# Patient Record
Sex: FEMALE | Race: WHITE | Employment: UNEMPLOYED | URBAN - METROPOLITAN AREA
[De-identification: names, ages, dates, MRNs, and addresses within clinical notes are randomized per-mention and may not be internally consistent; named-entity substitution may affect disease eponyms.]

---

## 2021-05-24 ENCOUNTER — HOSPITAL ENCOUNTER (OUTPATIENT)
Dept: NUTRITION | Age: 56
End: 2021-05-24

## 2021-09-10 RX ORDER — CHOLECALCIFEROL (VITAMIN D3) 50 MCG
CAPSULE ORAL DAILY
COMMUNITY

## 2021-09-10 RX ORDER — OMEPRAZOLE 40 MG/1
40 CAPSULE, DELAYED RELEASE ORAL DAILY
COMMUNITY

## 2021-09-10 RX ORDER — METOPROLOL SUCCINATE 25 MG/1
50 TABLET, EXTENDED RELEASE ORAL DAILY
COMMUNITY

## 2021-09-10 RX ORDER — RIZATRIPTAN BENZOATE 10 MG/1
10 TABLET ORAL
COMMUNITY

## 2021-09-10 RX ORDER — LOSARTAN POTASSIUM AND HYDROCHLOROTHIAZIDE 12.5; 5 MG/1; MG/1
1 TABLET ORAL DAILY
COMMUNITY

## 2021-09-10 RX ORDER — SIMVASTATIN 20 MG/1
20 TABLET, FILM COATED ORAL
COMMUNITY

## 2021-09-10 RX ORDER — BUPROPION HYDROCHLORIDE 150 MG/1
150 TABLET ORAL DAILY
COMMUNITY

## 2021-09-10 RX ORDER — ASPIRIN 81 MG/1
81 TABLET ORAL DAILY
COMMUNITY

## 2021-09-13 ENCOUNTER — HOSPITAL ENCOUNTER (OUTPATIENT)
Age: 56
Setting detail: OUTPATIENT SURGERY
Discharge: HOME | End: 2021-09-13
Attending: INTERNAL MEDICINE | Admitting: INTERNAL MEDICINE
Payer: OTHER GOVERNMENT

## 2021-09-13 VITALS
DIASTOLIC BLOOD PRESSURE: 75 MMHG | BODY MASS INDEX: 34.02 KG/M2 | WEIGHT: 204.2 LBS | OXYGEN SATURATION: 96 % | RESPIRATION RATE: 18 BRPM | HEIGHT: 65 IN | SYSTOLIC BLOOD PRESSURE: 121 MMHG | HEART RATE: 84 BPM | TEMPERATURE: 98.8 F

## 2021-09-13 DIAGNOSIS — R07.9 CHEST PAIN: ICD-10-CM

## 2021-09-13 LAB
ANION GAP SERPL CALC-SCNC: 11 MMOL/L (ref 3–18)
ATRIAL RATE: 69 BPM
BUN SERPL-MCNC: 14 MG/DL (ref 7–18)
BUN/CREAT SERPL: 18 (ref 12–20)
CALCIUM SERPL-MCNC: 9.6 MG/DL (ref 8.5–10.1)
CALCULATED P AXIS, ECG09: 66 DEGREES
CALCULATED R AXIS, ECG10: 60 DEGREES
CALCULATED T AXIS, ECG11: 58 DEGREES
CHLORIDE SERPL-SCNC: 105 MMOL/L (ref 100–111)
CHOLEST SERPL-MCNC: 232 MG/DL
CO2 SERPL-SCNC: 24 MMOL/L (ref 21–32)
CREAT SERPL-MCNC: 0.8 MG/DL (ref 0.6–1.3)
DIAGNOSIS, 93000: NORMAL
ERYTHROCYTE [DISTWIDTH] IN BLOOD BY AUTOMATED COUNT: 11.7 % (ref 11.6–14.5)
GLUCOSE SERPL-MCNC: 336 MG/DL (ref 74–99)
HCT VFR BLD AUTO: 46.1 % (ref 35–45)
HDLC SERPL-MCNC: 35 MG/DL (ref 40–60)
HDLC SERPL: 6.6 {RATIO} (ref 0–5)
HGB BLD-MCNC: 16 G/DL (ref 12–16)
INR PPP: 0.9 (ref 0.8–1.2)
LDLC SERPL CALC-MCNC: 149.4 MG/DL (ref 0–100)
LIPID PROFILE,FLP: ABNORMAL
MAGNESIUM SERPL-MCNC: 2 MG/DL (ref 1.6–2.6)
MCH RBC QN AUTO: 31.7 PG (ref 24–34)
MCHC RBC AUTO-ENTMCNC: 34.7 G/DL (ref 31–37)
MCV RBC AUTO: 91.5 FL (ref 78–100)
P-R INTERVAL, ECG05: 156 MS
PLATELET # BLD AUTO: 224 K/UL (ref 135–420)
PMV BLD AUTO: 10.2 FL (ref 9.2–11.8)
POTASSIUM SERPL-SCNC: 4 MMOL/L (ref 3.5–5.5)
PROTHROMBIN TIME: 11.7 SEC (ref 11.5–15.2)
Q-T INTERVAL, ECG07: 412 MS
QRS DURATION, ECG06: 76 MS
QTC CALCULATION (BEZET), ECG08: 441 MS
RBC # BLD AUTO: 5.04 M/UL (ref 4.2–5.3)
SODIUM SERPL-SCNC: 140 MMOL/L (ref 136–145)
TRIGL SERPL-MCNC: 238 MG/DL (ref ?–150)
VENTRICULAR RATE, ECG03: 69 BPM
VLDLC SERPL CALC-MCNC: 47.6 MG/DL
WBC # BLD AUTO: 8.6 K/UL (ref 4.6–13.2)

## 2021-09-13 PROCEDURE — C1769 GUIDE WIRE: HCPCS | Performed by: INTERNAL MEDICINE

## 2021-09-13 PROCEDURE — 77030008543 HC TBNG MON PRSS MRTM -A: Performed by: INTERNAL MEDICINE

## 2021-09-13 PROCEDURE — 93005 ELECTROCARDIOGRAM TRACING: CPT

## 2021-09-13 PROCEDURE — 77030004522 HC CATH ANGI DX EXPO BSC -A: Performed by: INTERNAL MEDICINE

## 2021-09-13 PROCEDURE — 74011250636 HC RX REV CODE- 250/636: Performed by: INTERNAL MEDICINE

## 2021-09-13 PROCEDURE — 99153 MOD SED SAME PHYS/QHP EA: CPT | Performed by: INTERNAL MEDICINE

## 2021-09-13 PROCEDURE — 77030004521 HC CATH ANGI DX COOK -B: Performed by: INTERNAL MEDICINE

## 2021-09-13 PROCEDURE — 80061 LIPID PANEL: CPT

## 2021-09-13 PROCEDURE — 93458 L HRT ARTERY/VENTRICLE ANGIO: CPT | Performed by: INTERNAL MEDICINE

## 2021-09-13 PROCEDURE — 85610 PROTHROMBIN TIME: CPT

## 2021-09-13 PROCEDURE — 77030016699 HC CATH ANGI DX INFN1 CARD -A: Performed by: INTERNAL MEDICINE

## 2021-09-13 PROCEDURE — 80048 BASIC METABOLIC PNL TOTAL CA: CPT

## 2021-09-13 PROCEDURE — 85027 COMPLETE CBC AUTOMATED: CPT

## 2021-09-13 PROCEDURE — 99152 MOD SED SAME PHYS/QHP 5/>YRS: CPT | Performed by: INTERNAL MEDICINE

## 2021-09-13 PROCEDURE — C1894 INTRO/SHEATH, NON-LASER: HCPCS | Performed by: INTERNAL MEDICINE

## 2021-09-13 PROCEDURE — 74011000636 HC RX REV CODE- 636: Performed by: INTERNAL MEDICINE

## 2021-09-13 PROCEDURE — 74011000250 HC RX REV CODE- 250: Performed by: INTERNAL MEDICINE

## 2021-09-13 PROCEDURE — 77030042317 HC BND COMPR HEMSTAT -B: Performed by: INTERNAL MEDICINE

## 2021-09-13 PROCEDURE — 83735 ASSAY OF MAGNESIUM: CPT

## 2021-09-13 PROCEDURE — 77030013797 HC KT TRNSDUC PRSSR EDWD -A: Performed by: INTERNAL MEDICINE

## 2021-09-13 RX ORDER — MIDAZOLAM HYDROCHLORIDE 1 MG/ML
INJECTION, SOLUTION INTRAMUSCULAR; INTRAVENOUS AS NEEDED
Status: DISCONTINUED | OUTPATIENT
Start: 2021-09-13 | End: 2021-09-13 | Stop reason: HOSPADM

## 2021-09-13 RX ORDER — GUAIFENESIN 100 MG/5ML
81 LIQUID (ML) ORAL ONCE
Status: DISCONTINUED | OUTPATIENT
Start: 2021-09-13 | End: 2021-09-13 | Stop reason: HOSPADM

## 2021-09-13 RX ORDER — SODIUM CHLORIDE 9 MG/ML
25 INJECTION, SOLUTION INTRAVENOUS CONTINUOUS
Status: DISCONTINUED | OUTPATIENT
Start: 2021-09-13 | End: 2021-09-13 | Stop reason: HOSPADM

## 2021-09-13 RX ORDER — VERAPAMIL HYDROCHLORIDE 2.5 MG/ML
INJECTION, SOLUTION INTRAVENOUS AS NEEDED
Status: DISCONTINUED | OUTPATIENT
Start: 2021-09-13 | End: 2021-09-13 | Stop reason: HOSPADM

## 2021-09-13 RX ORDER — HEPARIN SODIUM 1000 [USP'U]/ML
INJECTION, SOLUTION INTRAVENOUS; SUBCUTANEOUS AS NEEDED
Status: DISCONTINUED | OUTPATIENT
Start: 2021-09-13 | End: 2021-09-13 | Stop reason: HOSPADM

## 2021-09-13 RX ORDER — HEPARIN SODIUM 200 [USP'U]/100ML
INJECTION, SOLUTION INTRAVENOUS
Status: COMPLETED | OUTPATIENT
Start: 2021-09-13 | End: 2021-09-13

## 2021-09-13 RX ORDER — LIDOCAINE HYDROCHLORIDE 10 MG/ML
INJECTION INFILTRATION; PERINEURAL AS NEEDED
Status: DISCONTINUED | OUTPATIENT
Start: 2021-09-13 | End: 2021-09-13 | Stop reason: HOSPADM

## 2021-09-13 RX ORDER — FENTANYL CITRATE 50 UG/ML
INJECTION, SOLUTION INTRAMUSCULAR; INTRAVENOUS AS NEEDED
Status: DISCONTINUED | OUTPATIENT
Start: 2021-09-13 | End: 2021-09-13 | Stop reason: HOSPADM

## 2021-09-13 RX ORDER — LANOLIN ALCOHOL/MO/W.PET/CERES
6000 CREAM (GRAM) TOPICAL DAILY
COMMUNITY

## 2021-09-13 RX ORDER — SODIUM CHLORIDE 0.9 % (FLUSH) 0.9 %
5-40 SYRINGE (ML) INJECTION AS NEEDED
Status: DISCONTINUED | OUTPATIENT
Start: 2021-09-13 | End: 2021-09-13 | Stop reason: HOSPADM

## 2021-09-13 RX ORDER — ZINC GLUCONATE 10 MG
250 LOZENGE ORAL DAILY
COMMUNITY

## 2021-09-13 RX ORDER — SODIUM CHLORIDE 0.9 % (FLUSH) 0.9 %
5-40 SYRINGE (ML) INJECTION EVERY 8 HOURS
Status: DISCONTINUED | OUTPATIENT
Start: 2021-09-13 | End: 2021-09-13 | Stop reason: HOSPADM

## 2021-09-13 RX ADMIN — SODIUM CHLORIDE 25 ML/HR: 900 INJECTION, SOLUTION INTRAVENOUS at 09:00

## 2021-09-13 NOTE — ROUTINE PROCESS
Cardiac Cath Lab:  Pre Procedure Chart Check     Patients chart was accessed and reviewed for possible and/or scheduled procedure. Creatinine Clearance:  Serum creatinine: 0.8 mg/dL 09/13/21 0845  Estimated creatinine clearance: 87.5 mL/min    Total Contrast  Load:  3 x estimated clearance amount=  262.5ml    75% of Contrast Load:  0.75 x Total Contrast Load=    197ml    Recent Labs     09/13/21  0845   WBC 8.6   RBC 5.04   HCT 46.1*   HGB 16.0      INR 0.9   PTP 11.7      K 4.0   BUN 14   CREA 0.80   GFRAA >60   GFRNA >60   CA 9.6       BMI: Body mass index is 34.51 kg/m². ALLERGIES:   Allergies   Allergen Reactions    Codeine Nausea and Vomiting    Morphine Unknown (comments)     headache       Lines:        Peripheral IV 09/13/21 Anterior; Left Forearm (Active)   Site Assessment Clean, dry, & intact 09/13/21 0905   Phlebitis Assessment 0 09/13/21 0905   Infiltration Assessment 0 09/13/21 0905   Dressing Status Clean, dry, & intact 09/13/21 0905   Dressing Type Transparent;Tape 09/13/21 0905   Hub Color/Line Status Blue;Flushed 09/13/21 8502   Action Taken Open ports on tubing capped 09/13/21 0905   Alcohol Cap Used Yes 09/13/21 0905          History:    Past Medical History:   Diagnosis Date    Arrhythmia 2021    Arthritis 2013    Asthma 2011    Diabetes ([facility] [address] 2021    GERD (gastroesophageal reflux disease) 2021    Hypertension 2021    Morbid obesity ([facility] [address]     Psychiatric disorder 2011     Past Surgical History:   Procedure Laterality Date    HX GYN  1990    tubule ligation    HX HEENT      HX HEENT      lower dental implant and bridge    HX OTHER SURGICAL      HX OTHER SURGICAL  2003    cartilage removal rib right    [place] ABDOMEN SURGERY PROC UNLISTED  1998    gallbladder     There is no problem list on file for this patient.

## 2021-09-13 NOTE — DISCHARGE INSTRUCTIONS
Cardiac Catheterization/Angiography Discharge Instructions    *Check the puncture site frequently for swelling or bleeding. If you see any bleeding, lie down and apply pressure over the area with a clean town or washcloth. Notify your doctor for any redness, swelling, drainage or oozing from the puncture site. Notify your doctor for any fever or chills. *If the leg or arm with the puncture becomes cold, numb or painful, go to ER    *Activity should be limited for the next 48 hours. Climb stairs as little as possible and avoid any stooping, bending or strenuous activity for 48 hours. No heavy lifting (anything over 10 pounds) for three days. *Do not drive for 48 hours. *You may resume your usual diet. Drink more fluids than usual.    *Have a responsible person drive you home and stay with you for at least 24 hours after your heart catheterization/angiography. *You may remove the bandage from your Right and Arm in 24 hours. You may shower in 24 hours. No tub baths, hot tubs or swimming for one week. Do not place any lotions, creams, powders, ointments over the puncture site for one week. You may place a clean band-aid over the puncture site each day for 5 days. Change this daily. DISCHARGE SUMMARY from Nurse    PATIENT INSTRUCTIONS:    After general anesthesia or intravenous sedation, for 24 hours or while taking prescription Narcotics:  · Limit your activities  · Do not drive and operate hazardous machinery  · Do not make important personal or business decisions  · Do  not drink alcoholic beverages  · If you have not urinated within 8 hours after discharge, please contact your surgeon on call.     Report the following to your surgeon:  · Excessive pain, swelling, redness or odor of or around the surgical area  · Temperature over 100.5  · Nausea and vomiting lasting longer than 4 hours or if unable to take medications  · Any signs of decreased circulation or nerve impairment to extremity: change in color, persistent  numbness, tingling, coldness or increase pain  · Any questions    What to do at Home:  Recommended activity: Activity as tolerated and no driving for today,   *  Please give a list of your current medications to your Primary Care Provider. *  Please update this list whenever your medications are discontinued, doses are      changed, or new medications (including over-the-counter products) are added. *  Please carry medication information at all times in case of emergency situations. These are general instructions for a healthy lifestyle:    No smoking/ No tobacco products/ Avoid exposure to second hand smoke  Surgeon General's Warning:  Quitting smoking now greatly reduces serious risk to your health. Obesity, smoking, and sedentary lifestyle greatly increases your risk for illness    A healthy diet, regular physical exercise & weight monitoring are important for maintaining a healthy lifestyle    You may be retaining fluid if you have a history of heart failure or if you experience any of the following symptoms:  Weight gain of 3 pounds or more overnight or 5 pounds in a week, increased swelling in our hands or feet or shortness of breath while lying flat in bed. Please call your doctor as soon as you notice any of these symptoms; do not wait until your next office visit. The discharge information has been reviewed with the patient and spouse. The patient and spouse verbalized understanding. Discharge medications reviewed with the patient and spouse and appropriate educational materials and side effects teaching were provided.   Patient armband removed and shredded    ___________________________________________________________________________________________________________________________________

## 2021-09-13 NOTE — Clinical Note
TRANSFER - OUT REPORT:     Verbal report given to: RN. Report consisted of patient's Situation, Background, Assessment and   Recommendations(SBAR). Opportunity for questions and clarification was provided. Patient transported with a Cardiac Cath Tech / Patient Care Tech. Patient transported to: recovery.

## 2021-09-13 NOTE — PROGRESS NOTES
Cardiology Progress Note        Patient: Marisol Aguilar        Sex: female          DOA: 9/13/2021  YOB: 1965      Age:  63 y.o.        LOS:  LOS: 0 days   Assessment/Plan   Date of Surgery Update:  Marisol Aguilar was seen and examined. History and physical has been reviewed. The patient has been examined.  There have been no significant clinical changes since the completion of the originally dated History and Physical.    Signed By: Gabriella Arango MD     September 13, 2021 10:43 AM           Signed By: Gabriella Arango MD     September 13, 2021

## 2021-09-13 NOTE — Clinical Note
TRANSFER - IN REPORT:     Verbal report received from: RN. Report consisted of patient's Situation, Background, Assessment and   Recommendations(SBAR). Opportunity for questions and clarification was provided. Assessment completed upon patient's arrival to unit and care assumed. Patient transported with a Cardiac Cath Tech / Patient Care Tech.

## 2021-09-13 NOTE — PROGRESS NOTES
Pt. Is all prepped and ready for procedure. 1045 Pt. To cath lab via stretcher. 1145 Pt received back from cath lab, awake and alert. No c/o pain. TR band dry and  intact to right wrist. Cath lab released 2cc air in TR band due to purplish discoloration to hand. Warm blanket applied to hand. 1215 Pt. With no complaints. Right hand appears more acrocyanotic at this time . Good sensation noted. 1245 Started to release air from TR band per protocol. 1255 Dr. Aldridge Ion in with pt.  1310 TR band totally released. Good pulses noted. Fingers pink in color. 2x2 sterile dressing and tegaderm dressing applied. Education complete. 12 Pt. dresssed with assistance of . Pt. To BR with assist and voided without difficulty. Pt. D/c'd via w/c in c/o  to private residence. No c/o pain. Pt. Given disc from cath lab along with education sheet about cardiac surgery and d/c instructions. Pt. In stable condition. Armbands removed and shredded. Dressing to right wrist dry and intact.

## (undated) DEVICE — TORCON NB, ADVANTAGE CATHETER: Brand: TORCON NB

## (undated) DEVICE — CATHETER ANGIO NTR 0.045 INX5 FRX100 CM THRULUMEN EXPO

## (undated) DEVICE — SHIELD RAD 14X16 IN W/ SCOOP ABSORBER

## (undated) DEVICE — RADIFOCUS GLIDEWIRE: Brand: GLIDEWIRE

## (undated) DEVICE — GLIDESHEATH SLENDER STAINLESS STEEL KIT: Brand: GLIDESHEATH SLENDER

## (undated) DEVICE — PACK PROCEDURE SURG CATH CUST

## (undated) DEVICE — SENSOR PLSE OXMTR AD CBL L36IN ADH FRM FIT SPO2 DISP

## (undated) DEVICE — STOPCOCK TRNSDUC 500PSI 3 W ROT M LUER LT BLU OFF HNDL R

## (undated) DEVICE — BAND RADIAL COMPR ARTERY 24CM -- REG BX/10

## (undated) DEVICE — Device

## (undated) DEVICE — GUIDEWIRE VASC L260CM DIA0.035IN RAD 3MM J TIP L7CM PTFE

## (undated) DEVICE — DRAPE,ANGIO,BRACH,STERILE,38X44: Brand: MEDLINE

## (undated) DEVICE — CATH DIAG F5 AL I 100CM -- INFINITI - ORDER AS EA

## (undated) DEVICE — CATH DIAG FR4 EXPO 5FRX100CM -- EXPO

## (undated) DEVICE — PROCEDURE KIT FLUID MGMT 10 FR CUST MAINFOLD

## (undated) DEVICE — PRESSURE MONITORING SET: Brand: TRUWAVE

## (undated) DEVICE — TUBING PRSS MON L24IN PVC RIG NONEXPANDING M TO FEM CONN

## (undated) DEVICE — CATHETER DIAG AD 5FR L100CM COR NYL JUDKINS R 5 DILATED